# Patient Record
Sex: MALE | URBAN - METROPOLITAN AREA
[De-identification: names, ages, dates, MRNs, and addresses within clinical notes are randomized per-mention and may not be internally consistent; named-entity substitution may affect disease eponyms.]

---

## 2024-01-20 ENCOUNTER — LAB (OUTPATIENT)
Dept: LAB | Facility: LAB | Age: 19
End: 2024-01-20
Payer: COMMERCIAL

## 2024-01-20 DIAGNOSIS — Z00.00 ENCOUNTER FOR GENERAL ADULT MEDICAL EXAMINATION WITHOUT ABNORMAL FINDINGS: Primary | ICD-10-CM

## 2024-01-20 LAB
ALBUMIN SERPL BCP-MCNC: 4.6 G/DL (ref 3.4–5)
ALP SERPL-CCNC: 60 U/L (ref 33–120)
ALT SERPL W P-5'-P-CCNC: 16 U/L (ref 10–52)
ANION GAP SERPL CALC-SCNC: 14 MMOL/L (ref 10–20)
AST SERPL W P-5'-P-CCNC: 12 U/L (ref 9–39)
BILIRUB SERPL-MCNC: 0.8 MG/DL (ref 0–1.2)
BUN SERPL-MCNC: 18 MG/DL (ref 6–23)
CALCIUM SERPL-MCNC: 9.6 MG/DL (ref 8.6–10.6)
CHLORIDE SERPL-SCNC: 102 MMOL/L (ref 98–107)
CHOLEST SERPL-MCNC: 146 MG/DL (ref 0–199)
CHOLESTEROL/HDL RATIO: 2.7
CO2 SERPL-SCNC: 29 MMOL/L (ref 21–32)
CREAT SERPL-MCNC: 0.8 MG/DL (ref 0.5–1.3)
EGFRCR SERPLBLD CKD-EPI 2021: >90 ML/MIN/1.73M*2
GLUCOSE SERPL-MCNC: 78 MG/DL (ref 74–99)
HDLC SERPL-MCNC: 55 MG/DL
LDLC SERPL CALC-MCNC: 80 MG/DL
NON HDL CHOLESTEROL: 91 MG/DL (ref 0–119)
POTASSIUM SERPL-SCNC: 4.8 MMOL/L (ref 3.5–5.3)
PROT SERPL-MCNC: 7.2 G/DL (ref 6.4–8.2)
SODIUM SERPL-SCNC: 140 MMOL/L (ref 136–145)
TRIGL SERPL-MCNC: 54 MG/DL (ref 0–149)
VLDL: 11 MG/DL (ref 0–40)

## 2024-01-20 PROCEDURE — 80053 COMPREHEN METABOLIC PANEL: CPT

## 2024-01-20 PROCEDURE — 36415 COLL VENOUS BLD VENIPUNCTURE: CPT

## 2024-01-20 PROCEDURE — 80061 LIPID PANEL: CPT

## 2024-10-13 ENCOUNTER — HOSPITAL ENCOUNTER (EMERGENCY)
Facility: HOSPITAL | Age: 19
Discharge: HOME | End: 2024-10-14
Attending: EMERGENCY MEDICINE
Payer: COMMERCIAL

## 2024-10-13 DIAGNOSIS — T78.40XA ALLERGIC REACTION, INITIAL ENCOUNTER: Primary | ICD-10-CM

## 2024-10-13 DIAGNOSIS — L50.9 URTICARIA: ICD-10-CM

## 2024-10-13 PROCEDURE — 99284 EMERGENCY DEPT VISIT MOD MDM: CPT | Mod: 25

## 2024-10-13 PROCEDURE — 99284 EMERGENCY DEPT VISIT MOD MDM: CPT | Performed by: EMERGENCY MEDICINE

## 2024-10-13 ASSESSMENT — COLUMBIA-SUICIDE SEVERITY RATING SCALE - C-SSRS
1. IN THE PAST MONTH, HAVE YOU WISHED YOU WERE DEAD OR WISHED YOU COULD GO TO SLEEP AND NOT WAKE UP?: NO
6. HAVE YOU EVER DONE ANYTHING, STARTED TO DO ANYTHING, OR PREPARED TO DO ANYTHING TO END YOUR LIFE?: NO
2. HAVE YOU ACTUALLY HAD ANY THOUGHTS OF KILLING YOURSELF?: NO

## 2024-10-13 ASSESSMENT — PAIN SCALES - GENERAL
PAINLEVEL_OUTOF10: 0 - NO PAIN
PAINLEVEL_OUTOF10: 0 - NO PAIN

## 2024-10-13 ASSESSMENT — PAIN - FUNCTIONAL ASSESSMENT: PAIN_FUNCTIONAL_ASSESSMENT: 0-10

## 2024-10-13 NOTE — Clinical Note
Kole Nur was seen and treated in our emergency department on 10/13/2024.  He may return to work on 10/15/2024.       If you have any questions or concerns, please don't hesitate to call.      Yovanny Phan MD

## 2024-10-14 VITALS
DIASTOLIC BLOOD PRESSURE: 69 MMHG | TEMPERATURE: 97.7 F | BODY MASS INDEX: 20.88 KG/M2 | HEIGHT: 69 IN | SYSTOLIC BLOOD PRESSURE: 112 MMHG | RESPIRATION RATE: 16 BRPM | OXYGEN SATURATION: 97 % | HEART RATE: 77 BPM | WEIGHT: 141 LBS

## 2024-10-14 PROCEDURE — 96374 THER/PROPH/DIAG INJ IV PUSH: CPT

## 2024-10-14 PROCEDURE — 96375 TX/PRO/DX INJ NEW DRUG ADDON: CPT

## 2024-10-14 PROCEDURE — 2500000004 HC RX 250 GENERAL PHARMACY W/ HCPCS (ALT 636 FOR OP/ED)

## 2024-10-14 RX ORDER — FAMOTIDINE 10 MG/ML
20 INJECTION INTRAVENOUS ONCE
Status: COMPLETED | OUTPATIENT
Start: 2024-10-14 | End: 2024-10-14

## 2024-10-14 NOTE — ED PROVIDER NOTES
History of Present Illness     History provided by: Patient and friend  Limitations to History: None  External Records Reviewed with Brief Summary: None    HPI:  Kole Nur is a 19 y.o. male with no significant past medical history presenting today for concern for an allergic reaction after ingesting a sandwich that was either containing or contaminated by a cashew spread.  The patient states that he is allergic to tree nuts and has had issues with them before.  He states that he does carry an EpiPen but he has never had to use it before, and did not use one today.  He states that while he was eating he noticed that he was developing some flushing and itching, his friend notes that he also developed some facial flushing.  He states that he took approximately 100 mg of Benadryl.  He states that he has had hives as well as flushing.  He did have a brief episode of feeling that his tongue was swollen but states that he no longer has this feeling.  He also describes abdominal pain with nausea, but denies vomiting.  He denies SOB/difficulty breathing.  He also denies chest pain, weakness, numbness, tingling, lightheadedness/dizziness.  He states that this ingestion occurred at approximately 1730 on 10/13    Physical Exam   Triage vitals:  T 36.5 °C (97.7 °F)  HR 91  BP (!) 162/94  RR 18  O2 99 % None (Room air)    General: Awake, alert, in no acute distress  Eyes: Gaze conjugate.  No scleral icterus or injection  HENT: Normo-cephalic, atraumatic. No stridor.  Evaluation of oropharynx is clear, without exudate.  There is some erythema noted in the posterior portions of the oropharynx, I do not note uvular deviation, swollen tonsils, swollen tongue  CV: Regular rate, regular rhythm. Radial pulses 2+ bilaterally  Resp: Breathing non-labored, speaking in full sentences.  Clear to auscultation bilaterally  GI: Soft, non-distended, non-tender. No rebound or guarding.  MSK/Extremities: No gross bony deformities. Moving  all extremities  Skin: Warm. Appropriate color.  Diffuse blanching urticaria that appear to be sparing the face and hands.  He has flushing on his arms, legs, chest, back, and neck and has several marks consistent with excoriation on his back and chest.  Neuro: Alert. Oriented. Face symmetric. Speech is fluent.  Gross strength and sensation intact in b/l UE and LEs  Psych: Appropriate mood and affect    Medical Decision Making & ED Course   Medical Decision Makin y.o. male with known allergy to tree nuts presenting after ingesting tree nuts at approximately 5:30 PM (1730) on 10/13.  He has evidence of allergic reactions in 3 systems including the skin, GI tract, and airway (tongue involvement and possible oral pharyngeal erythema).  This is consistent with anaphylactic reaction, but given the stability of the patient and no requirement for epinephrine prior to arrival and the extended time before presenting at the emergency department, I will hold off on use of epinephrine for now as his airway is not being compromised at this time.   I will treat the patient with IV Pepcid and IV Solu-Medrol in order to secure IV access, assist with overall securing of his airway and stabilization of his condition as well as to help provide some comfort.    Will continue to monitor the patient in the emergency department for no less than 2 hours   ----     Social Determinants of Health which Significantly Impact Care: None identified     EKG Independent Interpretation: EKG not obtained    Independent Result Review and Interpretation: Relevant laboratory and radiographic results were reviewed and independently interpreted by myself.  As necessary, they are commented on in the ED Course.    Chronic conditions affecting the patient's care: As documented above in [facility]    The patient was discussed with the following consultants/services: None    Care Considerations: As documented above in [facility]    ED Course:  ED Course as of 10/14/24  0203   Mon Oct 14, 2024   0049 ED Attending Attestation: 20 male who has an allergy to nuts, accidentally ate cashews today at approximately 530PM at a school event.  He started to note itchy throat and swelling of his lips.  He took initial dose of 50 mg of Benadryl.  Felt better for a few hours after this.  Approximately 9 PM he started to feel scratchy throat and noted a rash consistent with urticaria.  Took another 50 of Benadryl oral.  He did not give himself his EpiPen.  On arrival here patient has urticaria on bilateral upper extremities, has a sore throat but no lip, tongue or oropharynx edema.  No wheezing or stridor. Speaking in short sentences. Will observe for another 2-3 hours for worsening symptoms.   - Richi Day MD, MPH  ED Attending.  [KF]      ED Course User Index  [KF] Richi Day MD MPH         Diagnoses as of 10/14/24 0203   Urticaria   Allergic reaction, initial encounter     Disposition   Patient was discharged as a result of his evaluation and treatment in the emergency department.  He states that he was feeling well and was ready to be discharged.  All questions were answered and he feels comfortable going home.  Procedures   Procedures    This was a shared visit with an ED attending.  The patient was seen and discussed with the ED attending    Yovanny Phan MD  Emergency Medicine         Yovanny Phan MD  Resident  10/14/24 [id number]

## 2024-10-14 NOTE — DISCHARGE INSTRUCTIONS
You were seen in the emergency department for evaluation after having an allergic reaction to food.   You had already taken Benadryl prior to your arrival so we finished our usual set of orders by giving you a dose of steroids which reduces inflammation and a medication called Pepcid which is helpful for belly upset after allergic reactions.     We monitored you in the emergency department to make sure that you did not have any rebound symptoms, but everything appears to be going well.    If you experience new onset shortness of breath or noticed any swelling of your neck, tongue, lips, or any other structures in or around your mouth or neck, please return to the emergency department and consider calling EMS and/or using an EpiPen because these may be signs of a dangerous allergic reaction

## 2024-10-14 NOTE — ED TRIAGE NOTES
Pt was at an event and ate a wrap with cashew spread. Pt had an allergic reaction. Pt took 100mg of benadryl. Pt has an upset stomach and a rash. No difficulty breathing or swallowing.